# Patient Record
Sex: MALE | Race: WHITE | Employment: FULL TIME | URBAN - METROPOLITAN AREA
[De-identification: names, ages, dates, MRNs, and addresses within clinical notes are randomized per-mention and may not be internally consistent; named-entity substitution may affect disease eponyms.]

---

## 2023-02-10 ENCOUNTER — OFFICE VISIT (OUTPATIENT)
Dept: ORTHOPEDIC SURGERY | Age: 38
End: 2023-02-10
Payer: COMMERCIAL

## 2023-02-10 VITALS — HEIGHT: 72 IN | BODY MASS INDEX: 35.21 KG/M2 | WEIGHT: 260 LBS

## 2023-02-10 DIAGNOSIS — S83.241A ACUTE MEDIAL MENISCUS TEAR OF RIGHT KNEE, INITIAL ENCOUNTER: Primary | ICD-10-CM

## 2023-02-10 DIAGNOSIS — S83.511A COMPLETE TEAR OF ANTERIOR CRUCIATE LIGAMENT OF RIGHT KNEE, INITIAL ENCOUNTER: ICD-10-CM

## 2023-02-10 PROCEDURE — 99203 OFFICE O/P NEW LOW 30 MIN: CPT | Performed by: ORTHOPAEDIC SURGERY

## 2023-02-10 RX ORDER — TESTOSTERONE CYPIONATE 100 MG/ML
INJECTION, SOLUTION INTRAMUSCULAR ONCE
COMMUNITY

## 2023-02-10 RX ORDER — DICLOFENAC SODIUM 75 MG/1
75 TABLET, DELAYED RELEASE ORAL 2 TIMES DAILY
Qty: 60 TABLET | Refills: 3 | Status: SHIPPED | OUTPATIENT
Start: 2023-02-10

## 2023-02-10 NOTE — PROGRESS NOTES
Krista Woo (: 1985) is a 40 y.o. male, new patient, here for evaluation of the following chief complaint(s):  Knee Pain       ASSESSMENT/PLAN:  Below is the assessment and plan developed based on review of pertinent history, physical exam, labs, studies, and medications. Findings were discussed with the patient today. We will obtain an MRI which will help us with further treatment planning including the possibility of surgical treatment. Patient will follow up after this MRI is performed. 1. Acute medial meniscus tear of right knee, initial encounter  -     MRI KNEE RT WO CONT; Future  2. Complete tear of anterior cruciate ligament of right knee, initial encounter  -     MRI KNEE RT WO CONT; Future      Return for imaging results after study performed. SUBJECTIVE/OBJECTIVE:  Krista Woo (: 1985) is a 40 y.o. male. He is not active individual and enjoys [employer]. He was wrestling a few days ago and sustained a sharp twisting/valgus buckling incident to the right knee. He felt a pop and since then he has had difficulty weightbearing on the right knee. He notes significant swelling and tightness with attempted range of motion. Allergies   Allergen Reactions    Penicillins Other (comments)       Current Outpatient Medications   Medication Sig    testosterone cypionate (DEPO-TESTOSTERONE) 100 mg/mL injection by IntraMUSCular route once. No current facility-administered medications for this visit.        Social History     Socioeconomic History    Marital status: UNKNOWN     Spouse name: Not on file    Number of children: Not on file    Years of education: Not on file    Highest education level: Not on file   Occupational History    Not on file   Tobacco Use    Smoking status: Former     Types: Cigarettes    Smokeless tobacco: Not on file   Vaping Use    Vaping Use: Never used   Substance and Sexual Activity    Alcohol use: Yes    Drug use: Never    Sexual activity: Not on file Other Topics Concern    Not on file   Social History Narrative    Not on file     Social Determinants of Health     Financial Resource Strain: Not on file   Food Insecurity: Not on file   Transportation Needs: Not on file   Physical Activity: Not on file   Stress: Not on file   Social Connections: Not on file   Intimate Partner Violence: Not on file   Housing Stability: Not on file       No past surgical history on file. No family history on file. REVIEW OF SYSTEMS:  ROS    Positive for: Musculoskeletal  Last edited by Mallika Tinoco on 2/10/2023  8:45 AM.        Patient denies any recent fever, chills, nausea, vomiting, chest pain, or shortness of breath. Vitals:  Ht 6' (1.829 m)   Wt 260 lb (117.9 kg)   BMI 35.26 kg/m²    Body mass index is 35.26 kg/m². PHYSICAL EXAM:  General exam: Patient is awake, alert, and oriented x3. Well-appearing. No acute distress. Ambulates with an antalgic gait. Heart/Lungs:  no respiratory distress, palpable pulses    Right Knee: There is tenderness to palpation along the joint line, and an effusion is present. There is discomfort with stability testing, and I cannot elicit an endpoint to the ACL on Lachman testing. Limited range of motion noted secondary to discomfort and swelling. Neurovascularly intact distally. There is no erythema, warmth or skin lesions present. IMAGING:  X-rays of the right knee from an outside facility multiple views were reviewed showing no signs of acute fracture. Normal joint spaces. Obvious effusion  XR Results (most recent):  No results found for this or any previous visit. Orders Placed This Encounter    MRI KNEE RT WO CONT     Standing Status:   Future     Standing Expiration Date:   6/10/2023     Order Specific Question:   Arthrogram study     Answer: No              An electronic signature was used to authenticate this note.   -- Augustus Casarez, DO

## 2023-02-20 ENCOUNTER — HOSPITAL ENCOUNTER (OUTPATIENT)
Dept: MRI IMAGING | Age: 38
Discharge: HOME | End: 2023-02-20
Attending: ORTHOPAEDIC SURGERY
Payer: COMMERCIAL

## 2023-02-20 DIAGNOSIS — S83.241A ACUTE MEDIAL MENISCUS TEAR OF RIGHT KNEE, INITIAL ENCOUNTER: ICD-10-CM

## 2023-02-20 DIAGNOSIS — S83.511A COMPLETE TEAR OF ANTERIOR CRUCIATE LIGAMENT OF RIGHT KNEE, INITIAL ENCOUNTER: ICD-10-CM

## 2023-02-20 PROCEDURE — 73721 MRI JNT OF LWR EXTRE W/O DYE: CPT

## 2023-02-27 ENCOUNTER — OFFICE VISIT (OUTPATIENT)
Dept: ORTHOPEDIC SURGERY | Age: 38
End: 2023-02-27
Payer: COMMERCIAL

## 2023-02-27 VITALS — WEIGHT: 260 LBS | HEIGHT: 72 IN | BODY MASS INDEX: 35.21 KG/M2

## 2023-02-27 DIAGNOSIS — S83.511A COMPLETE TEAR OF ANTERIOR CRUCIATE LIGAMENT OF RIGHT KNEE, INITIAL ENCOUNTER: Primary | ICD-10-CM

## 2023-02-27 PROCEDURE — 99214 OFFICE O/P EST MOD 30 MIN: CPT | Performed by: ORTHOPAEDIC SURGERY

## 2023-02-27 NOTE — PROGRESS NOTES
Florencio Araujo (: 1985) is a 40 y.o. male, established patient, here for evaluation of the following chief complaint(s):  Knee Pain       ASSESSMENT/PLAN:  Below is the assessment and plan developed based on review of pertinent history, physical exam, labs, studies, and medications. Findings were discussed with the patient today. We reviewed his MRI and further treatment options. He would like to plan for right knee arthroscopy with ACL reconstruction using quadriceps tendon autograft. Risks and benefits of surgical treatment were explained. We discussed risks of infection, blood loss, neurovascular injury, anesthesia risks, and risk secondary to patient comorbidities. Risk of continued knee pain/instability was explained. We discussed that implants may need to be used for this procedure. We discussed that there may be need for future surgical procedures. Patient understands the risks of this procedure and elects to schedule in the near future. 1. Complete tear of anterior cruciate ligament of right knee, initial encounter      Return for surgery. SUBJECTIVE/OBJECTIVE:  Florencio Araujo (: 1985) is a 40 y.o. male. He presents today for follow-up of his right knee MRI. He notes continued aching pain, swelling, and buckling in the right knee. He has not been able to return to his [facility]. His daily activities are limited        Allergies   Allergen Reactions    Penicillins Other (comments)       Current Outpatient Medications   Medication Sig    testosterone cypionate (DEPO-TESTOSTERONE) 100 mg/mL injection by IntraMUSCular route once. diclofenac EC (VOLTAREN) 75 mg EC tablet Take 1 Tablet by mouth two (2) times a day. No current facility-administered medications for this visit.        Social History     Socioeconomic History    Marital status: SINGLE     Spouse name: Not on file    Number of children: Not on file    Years of education: Not on file    Highest education level: Not on file   Occupational History    Not on file   Tobacco Use    Smoking status: Former     Types: Cigarettes    Smokeless tobacco: Not on file   Vaping Use    Vaping Use: Never used   Substance and Sexual Activity    Alcohol use: Yes    Drug use: Never    Sexual activity: Not on file   Other Topics Concern    Not on file   Social History Narrative    Not on file     Social Determinants of Health     Financial Resource Strain: Not on file   Food Insecurity: Not on file   Transportation Needs: Not on file   Physical Activity: Not on file   Stress: Not on file   Social Connections: Not on file   Intimate Partner Violence: Not on file   Housing Stability: Not on file       No past surgical history on file. No family history on file. REVIEW OF SYSTEMS:  ROS    Positive for: Musculoskeletal  Last edited by Amy Ferreira on 2/27/2023  8:21 AM.        Patient denies any recent fever, chills, nausea, vomiting, chest pain, or shortness of breath. Vitals:  Ht 6' (1.829 m)   Wt 260 lb (117.9 kg)   BMI 35.26 kg/m²    Body mass index is 35.26 kg/m². PHYSICAL EXAM:  General exam: Patient is awake, alert, and oriented x3. Well-appearing. No acute distress. Ambulates with an antalgic gait. Heart/Lungs:  no respiratory distress, palpable pulses    Right Knee: There is tenderness to palpation along the joint line, and an effusion is present. There is discomfort with stability testing, and I cannot elicit an endpoint to the ACL on Lachman testing. Limited range of motion noted secondary to discomfort and swelling. Neurovascularly intact distally. There is no erythema, warmth or skin lesions present. IMAGING:  MRI Results (most recent):  Results from [place] encounter on 02/20/23    MRI KNEE RT WO CONT    Narrative  EXAM: MRI KNEE RT WO CONT    INDICATION: Knee pain. Meniscal tear.     COMPARISON: None    TECHNIQUE: Axial T2 fat-saturated and proton density fat-saturated; coronal T1  and proton density fat-saturated; and sagittal T2 fat-saturated, proton density  fat-saturated, and gradient echo MRI of the right knee . CONTRAST: None. FINDINGS: Bone marrow: Mild bone marrow contusions are seen in the femoral  condyles and posterior tibial plateaus. Joint fluid: Moderate knee joint effusion. Collateral ligaments and posterior, lateral corner: There is edema along the  proximal MCL related to a grade 1 sprain. The LCL and posterior lateral corner  are intact. Medial meniscus: Intact. Lateral meniscus: Intact. ACL and PCL: There is a complete tear of the proximal ACL. Tendons: Intact. Muscles: Within normal limits. Patellofemoral alignment: No patellar subluxation/tilt. Trochlear groove is not  hypoplastic. TT-TG distance: 12 mm    Articular cartilage: Intact. No focal osteochondral lesion. Soft tissue mass: None. Impression  1. Complete ACL tear. 2. Grade 1 MCL sprain. 3. Mild bone marrow contusions in the femoral condyles and posterior tibial  plateaus. 4. Moderate knee joint effusion. XR Results (most recent):  No results found for this or any previous visit. No orders of the defined types were placed in this encounter. An electronic signature was used to authenticate this note.   -- Peyton Cotto DO

## 2023-02-27 NOTE — PATIENT INSTRUCTIONS
What to expect after ACL Reconstruction   Dr. Orlando Watkins (my medical assistant) TO SCHEDULE SURGERY OR WITH QUESTIONS. HER DIRECT NUMBER [phone]    You should not have ANYTHING to eat or drink after midnight the night before your surgery. This includes NO no gum, mints, candy, lifesavers or lollipops! Please make sure to remove ALL jewelry. When you arrive at the hospital or surgery center, you will be checked in and given an IV. You will be offered a nerve block, which I do recommend. This will be done pre-operatively by the anesthesiologist. It is an injection around your upper thigh that numbs the nerves to your leg and lasts 15 - 20 hours. This will give you pain relief that hopefully lasts throughout your first night. When the nerve block wears off, you will likely be in pain. This can range from mild to severe. If you experience severe pain, this is still normal. Nothing is wrong. You would have woke up with worse pain if you did not have the nerve block! During first three days, the pain is usually the worst, and then gradually subsides after that. Numbness, tingling, soreness and bruising are all normal   Your knee may also be warm to touch for the first few days. You may also experience swelling in the leg, ankle and foot. This is normal. I recommend ice and elevation   You will likely continue to have pain for several weeks or even months. Recovery from knee surgery takes several months. BE PATIENT! You may be weight bearing as tolerated in your brace  At your first follow up appointment, you will have your sutures removed and will be given a script for physical therapy  You will be in therapy for about 8 weeks - 12 weeks. Driving: If you had surgery on your LEFT knee, you can begin driving when you are no longer taking narcotic pain medications.   If you had surgery on your RIGHT knee, you should figure on starting to drive at 5 weeks post-op  If you had a meniscal repair, this will be 6 weeks post-op  Your restrictions will be as follows: (unless otherwise directed by your physician)  NO lifting/carrying /pushing/pulling greater than 10 lbs for 6 months  NO kneeling/crawling/climbing  You may begin to run at 3 months post-op   In sports: No cutting/pivoting for 6 months  Return to sports: 6-8 months depending on the sport

## 2023-03-06 ENCOUNTER — TELEPHONE (OUTPATIENT)
Dept: ORTHOPEDIC SURGERY | Age: 38
End: 2023-03-06

## 2023-03-21 DIAGNOSIS — S83.511A COMPLETE TEAR OF ANTERIOR CRUCIATE LIGAMENT OF RIGHT KNEE, INITIAL ENCOUNTER: Primary | ICD-10-CM

## 2023-03-21 RX ORDER — OXYCODONE HYDROCHLORIDE 5 MG/1
5 TABLET ORAL
Qty: 28 TABLET | Refills: 0 | Status: SHIPPED | OUTPATIENT
Start: 2023-03-21 | End: 2023-03-28

## 2023-03-29 ENCOUNTER — OFFICE VISIT (OUTPATIENT)
Dept: ORTHOPEDIC SURGERY | Age: 38
End: 2023-03-29
Payer: COMMERCIAL

## 2023-03-29 VITALS — BODY MASS INDEX: 35.21 KG/M2 | HEIGHT: 72 IN | WEIGHT: 260 LBS

## 2023-03-29 DIAGNOSIS — Z98.890 STATUS POST ARTHROSCOPY OF KNEE: Primary | ICD-10-CM

## 2023-03-29 PROCEDURE — 99024 POSTOP FOLLOW-UP VISIT: CPT | Performed by: ORTHOPAEDIC SURGERY

## 2023-03-29 NOTE — PROGRESS NOTES
Carolyn Hobson (: 1985) is a 40 y.o. male, established patient, here for evaluation of the following chief complaint(s):  Post OP Follow Up and Knee Pain       ASSESSMENT/PLAN:  Below is the assessment and plan developed based on review of pertinent history, physical exam, labs, studies, and medications. He will start a therapy regimen. I will plan to see him back in 1 month. We discussed his brace and ambulating with his brace locked in extension. We also discussed range of motion exercises and hamstring stretch. 1. Status post arthroscopy of knee  -     REFERRAL TO PHYSICAL THERAPY      Return in about 4 weeks (around 2023). SUBJECTIVE/OBJECTIVE:  Carolyn Hobson (: 1985) is a 40 y.o. male. He presents today status post right ACL reconstruction. Overall he notes that he is doing well. He did not take any pain medication yesterday. Allergies   Allergen Reactions    Penicillins Other (comments)       Current Outpatient Medications   Medication Sig    testosterone cypionate (DEPO-TESTOSTERONE) 100 mg/mL injection by IntraMUSCular route once. diclofenac EC (VOLTAREN) 75 mg EC tablet Take 1 Tablet by mouth two (2) times a day. (Patient not taking: Reported on 3/29/2023)     No current facility-administered medications for this visit.        Social History     Socioeconomic History    Marital status: SINGLE     Spouse name: Not on file    Number of children: Not on file    Years of education: Not on file    Highest education level: Not on file   Occupational History    Not on file   Tobacco Use    Smoking status: Former     Types: Cigarettes    Smokeless tobacco: Not on file   Vaping Use    Vaping Use: Never used   Substance and Sexual Activity    Alcohol use: Yes    Drug use: Never    Sexual activity: Not on file   Other Topics Concern    Not on file   Social History Narrative    Not on file     Social Determinants of Health     Financial Resource Strain: Not on file   Food Insecurity: Not on file   Transportation Needs: Not on file   Physical Activity: Not on file   Stress: Not on file   Social Connections: Not on file   Intimate Partner Violence: Not on file   Housing Stability: Not on file       History reviewed. No pertinent surgical history. History reviewed. No pertinent family history. REVIEW OF SYSTEMS:  ROS    Positive for: Musculoskeletal  Last edited by Crow President on 3/29/2023  8:17 AM.        Patient denies any recent fever, chills, nausea, vomiting, chest pain, or shortness of breath. Vitals:  Ht 6' (1.829 m)   Wt 260 lb (117.9 kg)   BMI 35.26 kg/m²    Body mass index is 35.26 kg/m². PHYSICAL EXAM:  General exam: Patient is awake, alert, and oriented x3. Well-appearing. No acute distress. Ambulates with an antalgic gait. Heart/Lungs:  no respiratory distress, palpable pulses    Right knee: Neurovascular and sensory intact. Incisions well-healed with no signs of erythema or drainage. Mild swelling and ecchymosis. There is some limitation in range of motion due to swelling. Normal stability. IMAGING:    XR Results (most recent):  No results found for this or any previous visit. Orders Placed This Encounter    REFERRAL TO PHYSICAL THERAPY     Referral Priority:   Routine     Referral Type:   PT/OT/ST     Referral Reason:   Specialty Services Required     Number of Visits Requested:   1              An electronic signature was used to authenticate this note.   -- Alber Hollis DO

## 2023-04-26 ENCOUNTER — OFFICE VISIT (OUTPATIENT)
Dept: ORTHOPEDIC SURGERY | Age: 38
End: 2023-04-26

## 2023-04-26 VITALS — BODY MASS INDEX: 35.21 KG/M2 | WEIGHT: 260 LBS | HEIGHT: 72 IN

## 2023-04-26 DIAGNOSIS — Z98.890 STATUS POST ARTHROSCOPY OF KNEE: Primary | ICD-10-CM

## 2023-04-26 NOTE — PROGRESS NOTES
Lana Otto (: 1985) is a 40 y.o. male, established patient, here for evaluation of the following chief complaint(s):  Post OP Follow Up and Knee Pain       ASSESSMENT/PLAN:  Below is the assessment and plan developed based on review of pertinent history, physical exam, labs, studies, and medications. Findings were discussed with the patient today. He is progressing very well status post ACL reconstruction. I will plan to see him back in 6 weeks to assess his progress. He will start to unlock his brace and may progress out of his brace when at home. I stressed the importance of maintaining precautions. He will continue to progress his therapy  1. Status post arthroscopy of knee      No follow-ups on file. SUBJECTIVE/OBJECTIVE:  Lana Otto (: 1985) is a 40 y.o. male. He presents today 5 weeks status post right ACL reconstruction. No complaints. Progressing well with therapy. Allergies   Allergen Reactions    Penicillins Other (comments)       Current Outpatient Medications   Medication Sig    testosterone cypionate (DEPO-TESTOSTERONE) 100 mg/mL injection by IntraMUSCular route once. diclofenac EC (VOLTAREN) 75 mg EC tablet Take 1 Tablet by mouth two (2) times a day. (Patient not taking: Reported on 3/29/2023)     No current facility-administered medications for this visit.        Social History     Socioeconomic History    Marital status: SINGLE     Spouse name: Not on file    Number of children: Not on file    Years of education: Not on file    Highest education level: Not on file   Occupational History    Not on file   Tobacco Use    Smoking status: Former     Types: Cigarettes    Smokeless tobacco: Not on file   Vaping Use    Vaping Use: Never used   Substance and Sexual Activity    Alcohol use: Yes    Drug use: Never    Sexual activity: Not on file   Other Topics Concern    Not on file   Social History Narrative    Not on file     Social Determinants of Health     Financial Resource Strain: Not on file   Food Insecurity: Not on file   Transportation Needs: Not on file   Physical Activity: Not on file   Stress: Not on file   Social Connections: Not on file   Intimate Partner Violence: Not on file   Housing Stability: Not on file       History reviewed. No pertinent surgical history. History reviewed. No pertinent family history. REVIEW OF SYSTEMS:  ROS    Positive for: Musculoskeletal  Last edited by Dany Zhang on 4/26/2023  9:11 AM.        Patient denies any recent fever, chills, nausea, vomiting, chest pain, or shortness of breath. Vitals:  Ht 6' (1.829 m)   Wt 260 lb (117.9 kg)   BMI 35.26 kg/m²    Body mass index is 35.26 kg/m². PHYSICAL EXAM:  General exam: Patient is awake, alert, and oriented x3. Well-appearing. No acute distress. Ambulates with a normal gait. Heart/Lungs:  no respiratory distress, palpable pulses    Right knee: Normal range of motion is noted. Improved quadricep strength. Stable Lachman's exam.    IMAGING:    XR Results (most recent):  No results found for this or any previous visit. No orders of the defined types were placed in this encounter. An electronic signature was used to authenticate this note.   -- Surinder Level, DO